# Patient Record
Sex: MALE | Race: WHITE | NOT HISPANIC OR LATINO | Employment: FULL TIME | URBAN - METROPOLITAN AREA
[De-identification: names, ages, dates, MRNs, and addresses within clinical notes are randomized per-mention and may not be internally consistent; named-entity substitution may affect disease eponyms.]

---

## 2020-03-10 ENCOUNTER — HOSPITAL ENCOUNTER (EMERGENCY)
Facility: HOSPITAL | Age: 25
Discharge: HOME | End: 2020-03-10
Attending: EMERGENCY MEDICINE
Payer: COMMERCIAL

## 2020-03-10 VITALS
DIASTOLIC BLOOD PRESSURE: 80 MMHG | HEART RATE: 85 BPM | BODY MASS INDEX: 19.62 KG/M2 | WEIGHT: 125 LBS | TEMPERATURE: 98 F | HEIGHT: 67 IN | SYSTOLIC BLOOD PRESSURE: 113 MMHG | OXYGEN SATURATION: 97 % | RESPIRATION RATE: 18 BRPM

## 2020-03-10 DIAGNOSIS — J06.9 UPPER RESPIRATORY TRACT INFECTION, UNSPECIFIED TYPE: Primary | ICD-10-CM

## 2020-03-10 LAB
INFLUENZA A, MOLECULAR: NEGATIVE
INFLUENZA B, MOLECULAR: NEGATIVE
SPECIMEN SOURCE: NORMAL

## 2020-03-10 PROCEDURE — 87502 INFLUENZA DNA AMP PROBE: CPT

## 2020-03-10 PROCEDURE — 99282 EMERGENCY DEPT VISIT SF MDM: CPT

## 2020-03-10 NOTE — ED NOTES
Dispo room prepared for patient who has donned mask at registration window; pt understand to wait at window until staff escort him to internal area

## 2020-03-10 NOTE — ED PROVIDER NOTES
SCRIBE #1 NOTE: I, David Waggoner, am scribing for, and in the presence of, Blanco Randolph Jr., MD. I have scribed the entire note.       History     Chief Complaint   Patient presents with    Generalized Body Aches     with chills, productive cough x 1 week; reports recent travel to [facility] [facility] via cruise ship     Review of patient's allergies indicates:  No Known Allergies      History of Present Illness     HPI    3/10/2020, 2:28 PM  History obtained from the patient      History of Present Illness: Guillaume Perez is a 24 y.o. male patient who presents to the Emergency Department for evaluation of generalized body aches which onset gradually 1 weeks PTA. Pt reports recent travel to [facility] [facility] via cruise ship. Pt was on the cruise from 2/22-2/29, and got off in [place] from the [facility] [place]. Symptoms are constant and moderate in severity. No mitigating or exacerbating factors reported. Associated sxs include chills and productive cough. Patient denies any fever, SOB, HA, dizziness, N/V/D, and all other sxs at this time. No prior Tx reported. No further complaints or concerns at this time.       Arrival mode: Personal vehicle    PCP: Primary Doctor No     Past Medical History:  History reviewed. No pertinent medical history.    Past Surgical History:  History reviewed. No pertinent surgical history.    Family History:  History reviewed. No pertinent family history.    Social History:  Social History Main Topics    Smoking status: Unknown if ever smoked    Smokeless tobacco: Unknown if ever used    Alcohol Use: Unknown drinking history    Drug Use: Unknown if ever used    Sexual Activity: Unknown        Review of Systems     Review of Systems   Constitutional: Positive for chills. Negative for fever.   HENT: Negative for sore throat.    Respiratory: Positive for cough (Productive). Negative for shortness of breath.    Cardiovascular: Negative for chest pain and leg swelling.    Gastrointestinal: Negative for abdominal pain, diarrhea, nausea and vomiting.   Genitourinary: Negative for dysuria.   Musculoskeletal: Positive for myalgias (Generalized). Negative for back pain, neck pain and neck stiffness.   Skin: Negative for rash and wound.   Neurological: Negative for dizziness, weakness, light-headedness, numbness and headaches.   Hematological: Does not bruise/bleed easily.   All other systems reviewed and are negative.     Physical Exam     Initial Vitals [03/10/20 1333]   BP Pulse Resp Temp SpO2   117/72 97 18 98.5 °F (36.9 °C) 99 %      MAP       --          Physical Exam  Nursing Notes and Vital Signs Reviewed.  Constitutional: Patient is in no acute distress. Well-developed and well-nourished.  Head: Atraumatic. Normocephalic.  Eyes: PERRL. EOM intact. Conjunctivae are not pale. No scleral icterus.  ENT: Mucous membranes are moist. Oropharynx is mildly erythematous without exudates.    Neck: Supple. Full ROM. No lymphadenopathy.  Cardiovascular: Regular rate. Regular rhythm. No murmurs, rubs, or gallops. Distal pulses are 2+ and symmetric.  Pulmonary/Chest: No respiratory distress. Clear to auscultation bilaterally. No wheezing or rales.  Abdominal: Soft and non-distended. There is no tenderness to palpation. No rebound or guarding. Good bowel sounds.  Genitourinary: No CVA tenderness  Musculoskeletal: Moves all extremities. No obvious deformities. No edema. No calf tenderness.  Skin: Warm and dry.  Neurological:  Alert, awake, and appropriate.  Normal speech.  No acute focal neurological deficits are appreciated.  Psychiatric: Normal affect. Good eye contact. Appropriate in content.     ED Course   Procedures  ED Vital Signs:  Vitals:    03/10/20 1333 03/10/20 1500 03/10/20 1600   BP: 117/72 112/74 113/80   Pulse: 97 92 85   Resp: 18 20 18   Temp: 98.5 °F (36.9 °C) 98.1 °F (36.7 °C) 98 °F (36.7 °C)   TempSrc: Oral Oral Oral   SpO2: 99% 100% 97%   Weight: 56.7 kg (125 lb)     Height:  "5' 7" (1.702 m)         Abnormal Lab Results:  Labs Reviewed   INFLUENZA A & B BY MOLECULAR        All Lab Results:  Results for orders placed or performed during the hospital encounter of 03/10/20   Influenza A & B by Molecular   Result Value Ref Range    Influenza A, Molecular Negative Negative    Influenza B, Molecular Negative Negative    Flu A & B Source Nasal swab        Imaging Results:  Imaging Results    None                 The Emergency Provider reviewed the vital signs and test results, which are outlined above.     ED Discussion     2:49 PM: Pt came in roughly 45 min. ago. Calling [facility].    3:49 PM: Discussed with Public Health (Epidemiology Division), who recommend no testing at this point in time, as pt doesn't meet criteria for Corona Virus testing.    3:53 PM: Reassessed pt at this time. Discussed with pt all pertinent ED information and results. Discussed pt dx and plan of tx. Gave pt all f/u and return to the ED instructions. All questions and concerns were addressed at this time. Pt expresses understanding of information and instructions, and is comfortable with plan to discharge. Pt is stable for discharge.    Regarding UPPER RESPIRATORY ILLNESS, for treatment, I encouraged patient to: drink plenty of fluids; get lots of rest; take medications as prescribed; use over-the-counter medications for symptomatic treatment;  and use a humidifier or steam in the bathroom to improve patency of upper airway.  Patient instructed to notify primary care provider, or return to the emergency department, if the they: have a cough most days or have a cough that returns frequently; begin coughing up blood; develop a high fever or shaking chills; have a low-grade fever for three or more days; develop thick, greenish mucus, especially if it has a bad smell; and experience shortness of breath or chest pain. For prevention, discussed with patient the importance of refraining from smoking (if " applicable), getting annual influenza vaccines, reducing exposure to air pollution, and the need to frequently wash hands to avoid spread of infection.     I discussed with patient and/or family/caretaker that evaluation in the ED does not suggest any emergent or life threatening medical conditions requiring immediate intervention beyond what was provided in the ED, and I believe patient is safe for discharge.  Regardless, an unremarkable evaluation in the ED does not preclude the development or presence of a serious of life threatening condition. As such, patient was instructed to return immediately for any worsening or change in current symptoms.     Medical Decision Making:   Clinical Tests:   Lab Tests: Ordered and Reviewed           ED Medication(s):  Medications - No data to display    There are no discharge medications for this patient.      Follow-up Information     [facility]. Schedule an appointment as soon as possible for a visit in 1 week.    Contact information:  [address] [address] [phone]             [facility]. Schedule an appointment as soon as possible for a visit in 1 week.    Specialty:  Internal Medicine  Contact information:  [address] [address] [phone]  Additional information:  2nd Floor - From [address], take the [facility] exit ([address] Enter the facility from the [address]           [facility] - .    Specialty:  Emergency Medicine  Why:  As needed, If symptoms worsen  Contact information:  [address] [facility] [address] [phone]                     Scribe Attestation:   Scribe #1: I performed the above scribed service and the documentation accurately describes the services I performed. I attest to the accuracy of the note.     Attending:   Physician Attestation Statement for Scribe #1: I, Blanco Randolph Jr., MD, personally performed the  services described in this documentation, as scribed by David Waggoner, in my presence, and it is both accurate and complete.           Clinical Impression       ICD-10-CM ICD-9-CM   1. Upper respiratory tract infection, unspecified type J06.9 465.9       Disposition:   Disposition: Discharged  Condition: Stable       Blanco Randolph Jr., MD  03/17/20 0857

## 2020-03-10 NOTE — ED NOTES
Patient identifiers verified and correct for Guillaume Perez.    LOC: The patient is awake, alert and aware of environment with an appropriate affect, the patient is oriented x 3 and speaking appropriately.  APPEARANCE: Patient resting comfortably and in no acute distress, patient is clean and well groomed, patient's clothing is properly fastened.  SKIN: The skin is warm and dry, color consistent with ethnicity, patient has normal skin turgor and moist mucus membranes, skin intact, no breakdown or bruising noted.  MUSCULOSKELETAL: Patient moving all extremities spontaneously.  RESPIRATORY: Airway is open and patent, respirations are spontaneous.  CARDIAC: Patient has a normal rate, no periphreal edema noted, capillary refill < 3 seconds.  ABDOMEN: Soft and non tender to palpation.

## 2020-05-11 ENCOUNTER — HISTORICAL (OUTPATIENT)
Dept: ADMINISTRATIVE | Facility: HOSPITAL | Age: 25
End: 2020-05-11

## 2020-05-11 LAB
ALCOHOL (ETHANOL), BLOOD: <3 MG/DL (ref 0–3)
ANION GAP SERPL CALC-SCNC: 9.6 MEQ/L (ref 10–20)
APAP SERPL-MCNC: <2 UG/ML (ref 10–30)
APPEARANCE, UA: CLEAR
BACTERIA SPEC CULT: NEGATIVE /HPF
BASOPHILS NFR BLD: 0 10 (ref 0–0.1)
BASOPHILS NFR BLD: 0.5 % (ref 0–1.5)
BILIRUB UR QL STRIP: NEGATIVE MG/DL
BUDDING YEAST: ABNORMAL /HPF
BUN SERPL-MCNC: 13 MG/DL (ref 7–18)
CALCIUM SERPL-MCNC: 9.4 MG/DL (ref 8.5–10.1)
CASTS, URINE MICROSCOPIC: NEGATIVE /LPF
CHLORIDE SERPL-SCNC: 103 MMOL/L (ref 98–107)
CO2 SERPL-SCNC: 29 MMOL/L (ref 22–32)
COLOR UR: ABNORMAL
CREAT SERPL-MCNC: 1.15 MG/DL (ref 0.7–1.3)
EGFR: 82 ML/MIN/1.73M
EOSINOPHIL NFR BLD: 0 10 (ref 0–0.7)
EOSINOPHIL NFR BLD: 0.1 % (ref 0–7)
EPITHELIAL, URINE MICROSCOPIC: NEGATIVE /HPF
ERYTHROCYTE [DISTWIDTH] IN BLOOD BY AUTOMATED COUNT: 12.5 % (ref 11.5–14.5)
GLUCOSE (UA): NEGATIVE MG/DL
GLUCOSE SERPL-MCNC: 91 MG/DL (ref 70–99)
GRAN #: 6.86 10 (ref 2–7.5)
GRAN%: 0.2 %
GRAN%: 82.7 % (ref 50–80)
HCT VFR BLD AUTO: 45 % (ref 43.5–53.7)
HGB BLD-MCNC: 15 G/DL (ref 14.1–18.1)
HGB UR QL STRIP: NEGATIVE ERY/UL
IMMATURE GRANULOCYTES #: 0.02 10
KETONES UR QL STRIP: 80 MG/DL
LEUKOCYTE ESTERASE UR QL STRIP: NEGATIVE LEU/UL
LYMPH #: 0.9 10 (ref 1–3.5)
LYMPH%: 11 % (ref 12–50)
MCH RBC QN AUTO: 31.1 PG (ref 27–31)
MCHC RBC AUTO-ENTMCNC: 33.3 G% (ref 32–35)
MCV RBC AUTO: 93.2 FL (ref 80–97)
MONO #: 0.5 10 (ref 0–0.8)
MONO%: 5.5 % (ref 0–12)
NITRITE UR QL STRIP: NEGATIVE MG/DL
OSMOC: 275 MOSM/KG (ref 275–295)
PH UR STRIP: 6 [PH] (ref 5–7.5)
PMV BLD AUTO: 10.4 FL (ref 7.4–10.4)
PMV BLD AUTO: 167 10 (ref 142–424)
POTASSIUM SERPL-SCNC: 3.6 MMOL/L (ref 3.5–5.1)
PROT UR QL STRIP: 30 MG/DL
RBC # BLD AUTO: 4.83 M/UL (ref 4.69–6.13)
RBC #/AREA URNS HPF: NEGATIVE /HPF
SALICYLATE LEVEL: 3.1 MG/DL (ref 2.8–20)
SODIUM BLD-SCNC: 138 MMOL/L (ref 136–145)
SP GR UR STRIP: 1.03 (ref 1–1.03)
SPERM, URINE MICROSCOPIC: ABNORMAL /HPF
TYPE OF SPECIMEN  (UA): ABNORMAL
UNCLASSIFIED CRYSTALS, UA: ABNORMAL /HPF
UROBILINOGEN UR STRIP-ACNC: 1 EU/L
WBC # BLD AUTO: 8.3 10 (ref 4–10.2)
WBC #/AREA URNS HPF: NEGATIVE /HPF

## 2020-05-12 LAB
CHOLEST SERPL-MSCNC: 169 MG/DL (ref 0–200)
CHOLEST/HDLC SERPL: 2.9 {RATIO}
HDL/CHOLESTEROL RATIO: 58 MG/DL (ref 40–60)
LDLC SERPL CALC-MCNC: 98 MG/DL (ref 0–130)
RPR: NON REACTIVE
T4 FREE SP9 P CHAL SERPL-SCNC: 1.45 NG/DL (ref 0.76–1.46)
TRIGL SERPL-MCNC: 66 MG/ML (ref 30–150)
TSH SERPL DL<=0.005 MIU/L-ACNC: 0.27 UIU/ML (ref 0.36–3.74)

## 2020-05-13 LAB
COVID-19 INTERNAL CONTROL: NORMAL
SARS-COV-2 RNA RESP QL NAA+PROBE: NOT DETECTED

## 2020-07-23 DIAGNOSIS — S43.001A SUBLUXATION OF RIGHT SHOULDER JOINT: Primary | ICD-10-CM

## 2020-08-14 ENCOUNTER — DOCUMENTATION ONLY (OUTPATIENT)
Dept: REHABILITATION | Facility: HOSPITAL | Age: 25
End: 2020-08-14

## 2020-08-14 NOTE — PT/OT/SLP DISCHARGE
Occupational Therapy Discharge Summary    Guillaume Perez  MRN: [id number]   Principal Problem: Left shoulder AC joint separation    Patient Discharged from acute Occupational Therapy on 8/14/20.  Please refer to prior OT note dated 7/13/20 for functional status.    Assessment:     Patient was discharged from OP OT services due to noncompliance with therapy participation. Information required to complete an accurate discharge summary is unknown.  Refer to therapy initial evaluation and last daily note for initial and most recent functional status and goal achievement.  Recommendations made may be found in medical record.    Objective:     GOALS: Unable to accurately determine progress/ status of STG and LTGs due to patient missed treatments and not being able to accurately perform test/measurements.    Reasons for Discontinuation of Therapy Services  Therapist determines that the patient will no longer benefit from therapy services.     Plan:     Patient Discharged to: Home no OT services needed    Regina Balderrama OT  8/14/2020